# Patient Record
Sex: FEMALE | Race: WHITE | Employment: FULL TIME | URBAN - METROPOLITAN AREA
[De-identification: names, ages, dates, MRNs, and addresses within clinical notes are randomized per-mention and may not be internally consistent; named-entity substitution may affect disease eponyms.]

---

## 2020-11-05 PROBLEM — K62.3 RECTAL PROLAPSE: Status: ACTIVE | Noted: 2020-11-05

## 2021-11-30 PROBLEM — K62.3 PARTIAL RECTAL PROLAPSE: Status: ACTIVE | Noted: 2021-11-30

## 2022-04-14 ENCOUNTER — HOSPITAL ENCOUNTER (OUTPATIENT)
Dept: PHYSICAL THERAPY | Age: 42
Discharge: HOME | End: 2022-04-14
Payer: COMMERCIAL

## 2022-04-14 PROCEDURE — 97162 PT EVAL MOD COMPLEX 30 MIN: CPT

## 2022-04-14 NOTE — PROGRESS NOTES
PT DAILY TREATMENT NOTE     Patient Name: Dale Saenz  Date:2022  : 1980  [x]  Patient  Verified  Payor: Misha Monroe / Plan: [place] [employer] / Product Type: HMO /    In time:10:43  Out time:11:33  Total Treatment Time (min): 50  Visit #: 1 of 8    Medicare/BCBS Only   Total Timed Codes (min):  15 1:1 Treatment Time:  na       Treatment Area: Pelvic floor dysfunction [M62.89]    SUBJECTIVE  Pain Level (0-10 scale): 0/10  Any medication changes, allergies to medications, adverse drug reactions, diagnosis change, or new procedure performed?: [x] No    [] Yes (see summary sheet for update)  Subjective functional status/changes:   [] No changes reported  Patient initial eval today see POC for details    OBJECTIVE    35 min [x]Eval                  []Re-Eval       7 nc min Therapeutic Exercise:  [x] See flow sheet :   Rationale: Increase pelvic floor muscle strength in order to Increase fecal continence. 8 min Self Care: Thorough review and handout provided on the following: bladder fitness   Rationale:  Increase awareness and understanding of current condition to improve patients ability to independently and effectively attain goals and progress towards long term management of current condition. With   [] TE   [] TA   [] neuro   [] other: Patient Education: [x] Review HEP    [] Progressed/Changed HEP based on:   [] positioning   [] body mechanics   [] transfers   [] heat/ice application    [] other:      Other Objective/Functional Measures:    Justification for Eval Code Complexity:  Patient History : see POC   Examination see exam   Clinical Presentation: evolving  Clinical Decision Making : FOTO : 55/100    Pain Level (0-10 scale) post treatment: 0/10    ASSESSMENT/Changes in Function:   Pt was instructed in initial HEP required verbal instruction for all exercises to perform correctly.  Pt was given hand out detailing exercises and instructed in modification of exercises to tolerance, and in performing exercises safely. Pt verbalized understanding. Patient will continue to benefit from skilled PT services to modify and progress therapeutic interventions, address functional mobility deficits, address ROM deficits, address strength deficits, analyze and address soft tissue restrictions, analyze and cue movement patterns, analyze and modify body mechanics/ergonomics, assess and modify postural abnormalities, address imbalance/dizziness and instruct in home and community integration to attain remaining goals.      [x]  See Plan of Care  []  See progress note/recertification  []  See Discharge Summary         Progress towards goals / Updated goals:  No progress as goals were set today    PLAN  []  Upgrade activities as tolerated     [x]  Continue plan of care  [x]  Update interventions per flow sheet       []  Discharge due to:_  []  Other:_      Radha Trejo 4/14/2022  10:27 AM    Future Appointments   Date Time Provider Nicolette Mercedes   4/14/2022 10:30 AM Breana Hernandez [facility]

## 2022-04-14 NOTE — PROGRESS NOTES
[facility] AT [address], [address] [place] [address] Road  Phone: [phone]   [phone]  PLAN OF CARE / [address] [place] [address] PHYSICAL THERAPY SERVICES  Patient Name: Sarah Briggs : 1980   Medical   Diagnosis: Pelvic floor dysfunction [M62.89] Treatment Diagnosis: Pelvic floor dysfunction [M62.89]   Other low back pain []   Onset Date: ~     Referral Source: Darrell Minor MD [facility]): 2022   Prior Hospitalization: See medical history Provider #: [id number]   Prior Level of Function: I in all ADLs no incontinence prior to prolapse surgery, active with weightlifting    Comorbidities: , 2x c section, 2x rectal prolapse repair, depression, arthritis, tobacco use   Medications: Verified on Patient Summary List   The Plan of Care and following information is based on the information from the initial evaluation.   ===========================================================================================  Assessment / key information:  Pt is a 35 y.o.  F referred for tx of pelvic floor dysfunction s/p childbirth ( 1x vaginal, 2x c section) and rectal prolapse repair surgery (20, and 21). Pt presents primarily with dec pelvic floor and core motor control and strength which is likely resulting in pt impaired continence (fecal and urinary), pelvic pressure/prolapse, as well as low back pain. Pt dec pelvic floor and core strength is also likely impairing her ability to functionally lift, jump, and participate in her typical exercise routine. Pt scored 3/5/4/10 on PERF indicating dec pelvic floor mm strength and endurance, and also demonstrated dec global core and hip strength as indicated in objective measures which is also likely contributing to pelvic floor dysfunction. Patient scored 54 on FOTO for bowel leakage indicating 45% functional impairment.   Patient can benefit from PT to increase pelvic floor muscle strength, decrease urinary urgency and incontinence, as well as improve hip/core strength, ROM, and dynamic stability for improved functional mobility and QOL. Treatment may include, but is not limited to, therapeutic exercise, therapeutic activities, neuromuscular re-education, manual therapy, and modalities as indicated including biofeedback at therapist discretion. Subjective: Pt reports that her sx started after her second pregnancy in 2020. She had some complications with all of her pregnancies with the placement of her children in the uterus. Her first baby was born vaginally, and she had to have a D&C afterward to remove the placenta. The next two pregnancies resulted in c sections. Her second c section took longer to heal. She feels the rectal prolapse started during her second pregnancy, and worsened after. She had rectal prolapse surgery on 7/7/2020, which resulted in a second surgery on 11/30/21 as the first surgery did not remove enough tissue. The second surgery resulted in prolonged healing, however she has no pain, but if she has loose stools at all she will lose bowel control. She also has mild urinary incontinence mostly with jumping or straining. Her bladder also feels like it drops and she gets pressure in her pelvis with any lifting and straining. She can feel the pressure if she has to strain at all when she poops, and she has not been able to lift weights due to the sx. Prior to her prolapse surgeries she had no issues with fecal incontinence or pelvic pressure and was able to lift . She does report moderate low back pain she feels is due to having abdominal separation during pregnancy. It is worse with lifting ( her 3year old son), increased activity, it is intermittent and she takes motrin daily to dec the pain. Ice  And rest also helps.      Objective:    Current complaints  stress incontinence, fecal incontinence    Bladder/Bowel complaint longevity:  3 - 5 years bladder  ~5 months bowel    Symptom progression:  worsened    Frequency of UI/FI: less than 1xper month UI, FI is dependent on diet    Pad use:  none    Pad wetness when changed:  drop or two    Daytime urinary/bowel frequency:  5-8x per day/1x per day    Nocturia:  1x/ night    Patient has failed previous pelvic floor muscle training? [] Yes    [x] No    Pelvic floor MMT  3 - good contraction, 3-5 seconds  PERF (Performance/Endurance/Repetitions//Flicks): 2/1/5/72     Pain complaint:  Location:low back    Pain scale:  Verbal Analog scale: average daily pain 3, best day 1, worst pain 6    Pain description:  daily: intermittent  worsens with: increased activity/movement and lifting  improves with: rest    Pelvic floor manual exam: Performed via internal vaginal assessment  female-upon vaginal palpation of the pelvic floor, the following was noted: good pelvic floor tone and tension with no pain upon palpation    FOTO= 55 bowel leakage     Global Core Strength/stability  Hip ROM: ext L=15 deg, R=20 deg,   Hip strength: flexion L=3+/5, R=4/5, ext L= 3+/5 R=4/5, abd  L=3+/5, R=4/5  Transverse abdominus activation: fair with dec endurance. Abdominal bulging noted with head lift, noted diastasis recti (3 cm at umbilicus extending 3cm above and below navel)  Functional squat:mild R lateral shift at depth, genu valgus noted JILLIAN L>R with SLS squat  Special test: positive for L ant/Rpost pelvic rotation,     ==========================================================================================================================  Evaluation Complexity History HIGH Complexity :3+ comorbidities / personal factors will impact the outcome/ POC ; Examination MEDIUM Complexity : 3 Standardized tests and measures addressing body structure, function, activity limitation and / or participation in recreation  ;Presentation MEDIUM Complexity : Evolving with changing characteristics  ; Clinical Decision Making MEDIUM Complexity : FOTO score of 26-74  Overall Complexity Rating: MEDIUM    Problem List: Pelvic pain/dysfunction, Decreased pelvic floor mm awareness, Decreased pelvic floor mm strength, Use of accessory muscles and Improper voiding habitspain affecting function, decrease ROM, decrease strength, edema affecting function, impaired gait/ balance, decrease ADL/ functional abilitiies, decrease activity tolerance, decrease flexibility/ joint mobility and decrease transfer abilities     Treatment Plan may include any combination of the following:   Therapeutic exercise, Urge suppression techniques, Neuromuscular re-education, Manual therapy, Physical agent/modality and Patient educationTherapeutic exercise, Therapeutic activities, Neuromuscular re-education, Physical agent/modality, Gait/balance training, Manual therapy, Patient education, Self Care training, Functional mobility training, Home safety training and Stair training    Patient / Family readiness to learn indicated by: asking questions  Persons(s) to be included in education: patient (P)  Barriers to Learning/Limitations: None  Measures taken, if barriers to learning:    Patient Goal (s): \"to exercise again & overall strengthening of pelvic floor to hold my parts in!\"   Patient self reported health status: good  Rehabilitation Potential: good    Short Term Goals: To be accomplished in 1 weeks:  1) Goal:  Patient performing pelvic floor exercises 3x day. Status at last note/certification: issued and reviewed  Long Term Goals:  To be accomplished in 8-12 treatments:  1) Goal: Patient will demonstrate 4/10/10/10 on PERF for improved pelvic floor strength  Status at last note/certification: 1/7/5/26   2) Goal: Patient independent in HEP  Status at last note/certification:issued and reviewed  3) Goal: Patient will report 50% improvement in pelvic pressure/prolapse sx for improved QOL  Status at last note/certification: na  4) Goal: Patient will report 50% improvement in bowel sx for improved QOL  Status at last note/certification: na  5) Goal: Patient will report ability to participate in typical exercise routine for improved general health and wellbeing. Status at last note/certification: unable      Frequency / Duration:   Patient to be seen  1  times per week for 8  weeks:  Patient / Caregiver education and instruction: self care and exercises  Therapist Signature: Michelle Dixon Date: 9/03/0889   Certification Period: na Time: 10:33 AM     ===========================================================================================  I certify that the above Physical Therapy Services are being furnished while the patient is under my care. I agree with the treatment plan and certify that this therapy is necessary. Physician Signature:        Date:       Time:        Manuel Partida MD  Please sign and return to [facility] AT [facility] at [facility] or you may fax the signed copy to [phone]. Thank you.

## 2022-04-18 ENCOUNTER — HOSPITAL ENCOUNTER (OUTPATIENT)
Dept: PHYSICAL THERAPY | Age: 42
Discharge: HOME | End: 2022-04-18
Payer: COMMERCIAL

## 2022-04-18 PROCEDURE — 97112 NEUROMUSCULAR REEDUCATION: CPT

## 2022-04-18 PROCEDURE — 97110 THERAPEUTIC EXERCISES: CPT

## 2022-04-18 NOTE — PROGRESS NOTES
PT DAILY TREATMENT NOTE    Patient Name: Ruben Chavis  Date:2022  : 1980  [x]  Patient  Verified  Payor: Tracy London / Plan: [employer] / Product Type: HMO /    In time:10:16  Out time:11:03  Total Treatment Time (min): 47  Total Timed Codes (min): 47  1:1 Treatment Time ( only): 52   Visit #: 2 of 8    Treatment Area: Pelvic floor dysfunction [M62.89]  Other low back pain [M54.59]    SUBJECTIVE  Pain Level (0-10 scale): 0/10  Any medication changes, allergies to medications, adverse drug reactions, diagnosis change, or new procedure performed?: [x] No    [] Yes (see summary sheet for update)  Subjective functional status/changes:   [] No changes reported  Patient reports she was extremely busy over the weekend due to having her daughters birthday party and easter, she has not been able to do her program yet. OBJECTIVE    26 min Therapeutic Exercise:  [x] See flow sheet :   Rationale: Increase pelvic floor muscle strength in order to Increase urinary continence and Increase fecal continence. 21 min Neuromuscular Re-education:  [x]  See flow sheet :   Rationale: Improve quality of pelvic floor contractions and Increase core strength in order to Increase urinary continence and Increase fecal continence. With   [] TE   [] TA   [] neuro   [] other: Patient Education: [x] Review HEP    [] Progressed/Changed HEP based on:   [] positioning   [] body mechanics   [] transfers   [] heat/ice application    [] other:      Other Objective/Functional Measures: program initiated today-1st follow up     Pain Level (0-10 scale) post treatment: 0/10    ASSESSMENT/Changes in Function: Pt was instructed in core and pelvic floor training today to aid in dynamic support of pelvis and pelvic organs. Pt required vc and demo with all exercises to perform correctly. Pt required vc and tc to align breathing with pelvic floor and core activation. Patient tolerated today's session well with no c/o pain. Patient demonstrated understanding of today's instruction, education, and recommendations. Patient is progressing appropriately towards goals. Patient will continue to benefit from skilled PT services to modify and progress therapeutic interventions, address functional mobility deficits, address ROM deficits, address strength deficits, analyze and address soft tissue restrictions, analyze and cue movement patterns, analyze and modify body mechanics/ergonomics, assess and modify postural abnormalities, address imbalance/dizziness and instruct in home and community integration to attain remaining goals. [x]  See Plan of Care  []  See progress note/recertification  []  See Discharge Summary         Progress towards goals / Updated goals:  Short Term Goals: To be accomplished in 1 weeks:  1) Goal:  Patient performing pelvic floor exercises 3x day. Status at last note/certification: issued and reviewed, noncompliant 4/18/22  Long Term Goals: To be accomplished in 8-12 treatments:  1) Goal: Patient will demonstrate 4/10/10/10 on PERF for improved pelvic floor strength  Status at last note/certification: 4/8/8/00   2) Goal: Patient independent in HEP  Status at last note/certification:issued and reviewed  3) Goal: Patient will report 50% improvement in pelvic pressure/prolapse sx for improved QOL  Status at last note/certification: na  4) Goal: Patient will report 50% improvement in bowel sx for improved QOL  Status at last note/certification: na  5) Goal: Patient will report ability to participate in typical exercise routine for improved general health and wellbeing.    Status at last note/certification: unable    PLAN  []  Upgrade activities as tolerated     [x]  Continue plan of care  [x]  Update interventions per flow sheet       []  Discharge due to:_  []  Other:_      Johnie Kim 4/18/2022  7:18 AM    Future Appointments   Date Time Provider Nicolette Mercedes   4/18/2022 10:15 AM Shruti Ybarra [facility] 4/27/2022  9:30 AM Taran Europe [facility]   5/4/2022  9:30 AM Blaine Europe [facility]   5/11/2022  [facility]   5/18/2022  9:30 AM Taran Chamberlain [facility]   5/25/2022  [facility]

## 2022-04-27 ENCOUNTER — APPOINTMENT (OUTPATIENT)
Dept: PHYSICAL THERAPY | Age: 42
End: 2022-04-27
Payer: COMMERCIAL

## 2022-05-11 ENCOUNTER — HOSPITAL ENCOUNTER (OUTPATIENT)
Dept: PHYSICAL THERAPY | Age: 42
End: 2022-05-11
Payer: COMMERCIAL

## 2022-05-18 ENCOUNTER — HOSPITAL ENCOUNTER (OUTPATIENT)
Dept: PHYSICAL THERAPY | Age: 42
Discharge: HOME | End: 2022-05-18
Payer: COMMERCIAL

## 2022-05-18 PROCEDURE — 97110 THERAPEUTIC EXERCISES: CPT

## 2022-05-18 PROCEDURE — 97112 NEUROMUSCULAR REEDUCATION: CPT

## 2022-05-18 NOTE — PROGRESS NOTES
[facility]YSICAL [facility]ERAPY  [address], [place], [address] - Phone: [phone]  Fax: [phone]  PROGRESS NOTE  Patient Name: Mikel Harris : 1980   Treatment/Medical Diagnosis: Pelvic floor dysfunction [M62.89]  Other low back pain [M54.59]   Referral Source: Olman Kumar MD     Date of Initial Visit: 22 Attended Visits: 3 Missed Visits: 3     SUMMARY OF TREATMENT  Pt is a 35 y.o.  F referred for tx of pelvic floor dysfunction. Skilled care has included therapeutic exercise, therapeutic activities, neuromuscular re-education, manual therapy, and modalities as indicated including bio feed back    CURRENT STATUS  Pt reports initially 50% improvement in sx when she was consistent with her program, she has unfortunately had a regression and now reports  0% overall improvement in sx. 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 Although progress at this point has been minimal, with improved attendance and consistency with HEP pt will likely improve with skilled care. Skilled care is warranted to continue in order to progress pt to PLOF. Pt and PT also discussed fecal incontinence issue with pt reporting ongoing intermittent loose stool with bloating and abdominal crapming with unknown cause.  Pt is considering seeking MD opinion on potential food intolerance and/or bacterial imbalance*    Functional Gains: improvements in pressure feeling, improved body awareness  Functional Deficits: continued bowel/bladder leakage, continued pressure feeling in pelvis  % improvement: 50% while doing program, currently 0% due to regression  Patient Goal: \"to continue to strengthen the muscles\"   Objective:     Current complaints  stress incontinence, fecal incontinence     Bladder/Bowel complaint longevity:  3 - 5 years bladder  ~5 months bowel     Symptom progression:  worsened     Frequency of UI/FI: less than 1xper month UI, FI is dependent on diet     Pad use:  none     Pad wetness when changed:  drop or two     Daytime urinary/bowel frequency:  5-8x per day/1x per day     Nocturia:  1x/ night     Pelvic floor MMT  3 - good contraction, 3-5 seconds  PERF (Performance/Endurance/Repetitions//Flicks): 8/1/6/64      Pain complaint:  Location:low back     Pain scale:  Verbal Analog scale: average daily pain 3, best day 1, worst pain 6     Pain description:  daily: intermittent  worsens with: increased activity/movement and lifting  improves with: rest     Pelvic floor manual exam: Performed via internal vaginal assessment  female-upon vaginal palpation of the pelvic floor, the following was noted: good pelvic floor tone and tension with no pain upon palpation     FOTO= will take at DC     Global Core Strength/stability  Hip ROM: ext L=15 deg, R=20 deg,   Hip strength: flexion L=3+/5, R=4/5, ext L= 3+/5 R=4/5, abd  L=3+/5, R=4/5  Transverse abdominus activation: fair with dec endurance. Abdominal bulging noted with head lift, noted diastasis recti (3 cm at umbilicus extending 3cm above and below navel)  Functional squat:mild R lateral shift at depth, genu valgus noted JILLIAN L>R with SLS squat  Special test: positive for L ant/Rpost pelvic rotation,     Goal/Measure of Progress Goal Met? 1. Patient performing pelvic floor exercises 3x day. Status at last Eval: issued and reviewed, Current Status: non compliant no   2. Patient will demonstrate 4/10/10/10 on PERF for improved pelvic floor strength   Status at last Eval: 3/5/4/10  Current Status: 3/5/4/10  no   3. Patient independent in HEP   Status at last Eval: issued and reviewed Current Status: reviewed, updated, not independent no, improving     Goal/Measure of Progress Goal Met? 1.   Patient will report 50% improvement in pelvic pressure/prolapse sx for improved QOL   Status at last Eval: na Current Status: 0% no   2. Patient will report 50% improvement in bowel sx for improved QOL   Status at last Eval: na Current Status: 0% no   3. Patient will report ability to participate in typical exercise routine for improved general health and wellbeing.    Status at last Eval: unable Current Status: unable no     New Goals to be achieved in __8__  treatments: continue toward goals as established at IE  Short Term Goals: To be accomplished in 1 weeks:  1) Goal:  Patient performing pelvic floor exercises 3x day. Status at last note/certification: issued and reviewed, non compliant  Long Term Goals: To be accomplished in 8-12 treatments:  1) Goal: Patient will demonstrate 4/10/10/10 on PERF for improved pelvic floor strength  Status at last note/certification: 3/5/4/10   2) Goal: Patient independent in HEP  Status at last note/certification:reviewed, updated, not independent  3) Goal: Patient will report 50% improvement in pelvic pressure/prolapse sx for improved QOL  Status at last note/certification: 0%  4) Goal: Patient will report 50% improvement in bowel sx for improved QOL  Status at last note/certification: 0%  5) Goal: Patient will report ability to participate in typical exercise routine for improved general health and wellbeing.   Status at last note/certification: unable    RECOMMENDATIONS  Continue with skilled care 1x per week for 8 weeks to progress pelvic floor strength, core stability, and motor control for improved ability to dynamically support the pelvic organs and return to PLOF  If you have any questions/comments please contact us directly at [phone]. Thank you for allowing us to assist in the care of your patient.     Therapist Signature: Mortimer Rusk DPOSMAR Date: 5/18/2022     Time: 7:50 AM   NOTE TO PHYSICIAN:  PLEASE COMPLETE THE ORDERS BELOW AND FAX TO   [facility]: [phone]  If you are unable to process this request in 24 hours please contact our office: [phone]    ___ I have read the above report and request that my patient continue as recommended.   ___ I have read the above report and request that my patient continue therapy with the following changes/special instructions:_________________________________________________________   ___ I have read the above report and request that my patient be discharged from therapy.      Physician Signature:        Date:       Time:       Jarvis Mejía MD

## 2022-05-18 NOTE — PROGRESS NOTES
PT DAILY TREATMENT NOTE    Patient Name: Alexey Cassette  Date:2022  : 1980  [x]  Patient  Verified  Payor: Jose Johnson / Plan: [employer] / Product Type: HMO /    In time:9:31  Out time:10:20  Total Treatment Time (min): 49  Total Timed Codes (min): 49  1:1 Treatment Time ( only): 52   Visit #: 3 of 8    Treatment Area: Pelvic floor dysfunction [M62.89]  Other low back pain [M54.59]    SUBJECTIVE  Pain Level (0-10 scale): 0/10  Any medication changes, allergies to medications, adverse drug reactions, diagnosis change, or new procedure performed?: [x] No    [] Yes (see summary sheet for update)  Subjective functional status/changes:   [] No changes reported  Patient reports she is having to start over, she was doing her HEP, and was noticing improvements in the pressure feeling, however, fell off her program due to getting busy with work and her nose being broken. She is currently on antibiotics due to a root canal and UTI. OBJECTIVE    28 min Therapeutic Exercise:  [x] See flow sheet :   Rationale: Increase pelvic floor muscle strength in order to Increase urinary continence and Increase fecal continence. 21 min Neuromuscular Re-education:  [x]  See flow sheet :   Rationale: Improve quality of pelvic floor contractions and Increase core strength in order to Increase urinary continence and Increase fecal continence. With   [] TE   [] TA   [] neuro   [] other: Patient Education: [x] Review HEP    [] Progressed/Changed HEP based on:   [] positioning   [] body mechanics   [] transfers   [] heat/ice application    [] other:      Other Objective/Functional Measures:    See PN  Pain Level (0-10 scale) post treatment: 0/10    ASSESSMENT/Changes in Function: Pt and PT reviewed last program update, and progressed program with addition of seated and standing exercises with intention of improving pelvic organ dynamic support with functional lifting.  PT plans to assess diaphragm and trunk fascial mobility NV for adhesions and tissue restriction that may be impairing mobility. PT also plans to add functional core strengthening as tolerated    Patient will continue to benefit from skilled PT services to modify and progress therapeutic interventions, address functional mobility deficits, address ROM deficits, address strength deficits, analyze and address soft tissue restrictions, analyze and cue movement patterns, analyze and modify body mechanics/ergonomics, assess and modify postural abnormalities, address imbalance/dizziness and instruct in home and community integration to attain remaining goals.      [x]  See Plan of Care  []  See progress note/recertification  []  See Discharge Summary         Progress towards goals / Updated goals:See PN    PLAN  []  Upgrade activities as tolerated     [x]  Continue plan of care  [x]  Update interventions per flow sheet       []  Discharge due to:_  []  Other:_      Tomás Noriega 5/18/2022  7:18 AM    Future Appointments   Date Time Provider Nicolette Mercedes   5/18/2022  9:30 AM Violette Rodríguez [facility]   5/25/2022  9:30 AM Violette Rodríguez [facility]

## 2022-05-25 ENCOUNTER — HOSPITAL ENCOUNTER (OUTPATIENT)
Dept: PHYSICAL THERAPY | Age: 42
Discharge: HOME | End: 2022-05-25
Payer: COMMERCIAL

## 2022-05-25 PROCEDURE — 97110 THERAPEUTIC EXERCISES: CPT

## 2022-05-25 PROCEDURE — 97140 MANUAL THERAPY 1/> REGIONS: CPT

## 2022-05-25 NOTE — PROGRESS NOTES
PT DAILY TREATMENT NOTE    Patient Name: Antonio Purvis  Date:2022  : 1980  [x]  Patient  Verified  Payor: Darrell Dragan / Plan: [employer] / Product Type: HMO /    In time:9:32  Out time:10:20  Total Treatment Time (min): 48  Total Timed Codes (min): 48  1:1 Treatment Time ([facility] only): 50  Visit #: 4 of 11    Treatment Area: Pelvic floor dysfunction [M62.89]  Other low back pain [M54.59]    SUBJECTIVE  Pain Level (0-10 scale): 0/10  Any medication changes, allergies to medications, adverse drug reactions, diagnosis change, or new procedure performed?: [x] No    [] Yes (see summary sheet for update)  Subjective functional status/changes:   [] No changes reported  Patient reports she has been doing her kegels on and off throughout the day when she remembers. OBJECTIVE    24 min Therapeutic Exercise:  [x] See flow sheet :   Rationale: Increase pelvic floor muscle strength in order to Increase urinary continence and Increase fecal continence. 24 min Manual Therapy:  Gr III t spine P/A glides, inferior glides to upper ribs, skin rolling to lumbar and t spine, diphragm release JILLIAN L>R, upper abdominal skin rolling   Rationale: increase tissue extensibility in order to Improve ability to perform ADLs and fully extend pelvic floor and diaphragm. With   [] TE   [] TA   [] neuro   [] other: Patient Education: [x] Review HEP    [] Progressed/Changed HEP based on:   [] positioning   [] body mechanics   [] transfers   [] heat/ice application    [] other:      Other Objective/Functional Measures: Added core strengthening  Pain Level (0-10 scale) post treatment: 0/10    ASSESSMENT/Changes in Function: Pt was instructed in updated core exercises with increasing core strength for improved ability to perform ADLs. Pt was educated in proper breathing technique as well as relationship between pelvic floor and diaphragm.  Manual therapy was incorporated with intention of improving pt ability to activate diaphragm to aid in fully relaxing pelvic floor and regulating intraabdominal pressure. Patient will continue to benefit from skilled PT services to modify and progress therapeutic interventions, address functional mobility deficits, address ROM deficits, address strength deficits, analyze and address soft tissue restrictions, analyze and cue movement patterns, analyze and modify body mechanics/ergonomics, assess and modify postural abnormalities, address imbalance/dizziness and instruct in home and community integration to attain remaining goals. [x]  See Plan of Care  []  See progress note/recertification  []  See Discharge Summary         Progress towards goals / Updated goals:  Short Term Goals: To be accomplished in 1 weeks:  1) Goal:  Patient performing pelvic floor exercises 3x day. Status at last note/certification: issued and reviewed, non compliant,  Partial compliance 5/25/22  Long Term Goals:  To be accomplished in 8-12 treatments:  1) Goal: Patient will demonstrate 4/10/10/10 on PERF for improved pelvic floor strength  Status at last note/certification: 3/5/4/10   2) Goal: Patient independent in HEP  Status at last note/certification:reviewed, updated, not independent  3) Goal: Patient will report 50% improvement in pelvic pressure/prolapse sx for improved QOL  Status at last note/certification: 0%  4) Goal: Patient will report 50% improvement in bowel sx for improved QOL  Status at last note/certification: 0%  5) Goal: Patient will report ability to participate in typical exercise routine for improved general health and wellbeing.   Status at last note/certification: unable  PLAN  []  Upgrade activities as tolerated     [x]  Continue plan of care  [x]  Update interventions per flow sheet       []  Discharge due to:_  []  Other:_      Franko Montgomery 5/25/2022  7:18 AM    Future Appointments   Date Time Provider Nicolette Mercedes   5/25/2022  9:30 AM Mckinley Brooke [facility]

## 2022-06-08 ENCOUNTER — HOSPITAL ENCOUNTER (OUTPATIENT)
Dept: PHYSICAL THERAPY | Age: 42
Discharge: HOME | End: 2022-06-08
Payer: COMMERCIAL

## 2022-06-08 PROCEDURE — 97110 THERAPEUTIC EXERCISES: CPT

## 2022-06-08 PROCEDURE — 97140 MANUAL THERAPY 1/> REGIONS: CPT

## 2022-06-08 NOTE — PROGRESS NOTES
PT DAILY TREATMENT NOTE    Patient Name: Dede Yanes  Date:2022  : 1980  [x]  Patient  Verified  Payor: Mariely Lozoya / Plan: [address] [place] [address] [employer] / Product Type: HMO /    In time:3:55  Out time:4:55  Total Treatment Time (min): 60  Total Timed Codes (min): 60  1:1 Treatment Time ( [address] only): 60  Visit #: 5 of 11    Treatment Area: Pelvic floor dysfunction [M62.89]  Other low back pain [M54.59]    SUBJECTIVE  Pain Level (0-10 scale): 0/10  Any medication changes, allergies to medications, adverse drug reactions, diagnosis change, or new procedure performed?: [x] No    [] Yes (see summary sheet for update)  Subjective functional status/changes:   [] No changes reported  Patient reports she has been working on her kegels as often as she remembers. OBJECTIVE    40 min Therapeutic Exercise:  [x] See flow sheet :   Rationale: Increase pelvic floor muscle strength in order to Increase urinary continence and Increase fecal continence. 20 min Manual Therapy:  skin rolling to lumbar and t spine, diphragm release JILLIAN L>R,  scar mobilization   Rationale: increase tissue extensibility in order to Improve ability to perform ADLs and fully extend pelvic floor and diaphragm. With   [] TE   [] TA   [] neuro   [] other: Patient Education: [x] Review HEP    [] Progressed/Changed HEP based on:   [] positioning   [] body mechanics   [] transfers   [] heat/ice application    [] other:      Other Objective/Functional Measures:    Progressed core strengthening  Pain Level (0-10 scale) post treatment: 0/10    ASSESSMENT/Changes in Function: Pt was again instructed in updated core program with emphasis on improving transverse abdominis strength while incorporating pelvic floor with intention of continuing to address pelvic pressure sx and incontinence. Pt reported no pain with any exercise, she did require vc and demo with all new exercises to perform correctly.  PT plans to add in thoracic mobilization NV for continued progression with pt ability to breathe/extend diaphragm. Patient will continue to benefit from skilled PT services to modify and progress therapeutic interventions, address functional mobility deficits, address ROM deficits, address strength deficits, analyze and address soft tissue restrictions, analyze and cue movement patterns, analyze and modify body mechanics/ergonomics, assess and modify postural abnormalities, address imbalance/dizziness and instruct in home and community integration to attain remaining goals. [x]  See Plan of Care  []  See progress note/recertification  []  See Discharge Summary         Progress towards goals / Updated goals:  Short Term Goals: To be accomplished in 1 weeks:  1) Goal:  Patient performing pelvic floor exercises 3x day. Status at last note/certification: issued and reviewed, non compliant,  Partial compliance 5/25/22  Long Term Goals:  To be accomplished in 8-12 treatments:  1) Goal: Patient will demonstrate 4/10/10/10 on PERF for improved pelvic floor strength  Status at last note/certification: 3/5/4/10 addressing with kegels 6/8/22   2) Goal: Patient independent in HEP  Status at last note/certification:reviewed, updated, not independent  3) Goal: Patient will report 50% improvement in pelvic pressure/prolapse sx for improved QOL  Status at last note/certification: 0%  4) Goal: Patient will report 50% improvement in bowel sx for improved QOL  Status at last note/certification: 0%  5) Goal: Patient will report ability to participate in typical exercise routine for improved general health and wellbeing.   Status at last note/certification: unable  PLAN  []  Upgrade activities as tolerated     [x]  Continue plan of care  [x]  Update interventions per flow sheet       []  Discharge due to:_  []  Other:_      Carman Mcardle 6/8/2022  7:18 AM    Future Appointments   Date Time Provider Nicolette Mercedes   6/8/2022  3:45 PM Bethena Emperor [facility]   6/15/2022 10:15 AM Nati Verde [facility]   6/22/2022 10:15 AM Nati CORCORAN [facility]   6/29/2022  9:30 AM Nati Verde [facility]

## 2022-06-15 ENCOUNTER — APPOINTMENT (OUTPATIENT)
Dept: PHYSICAL THERAPY | Age: 42
End: 2022-06-15
Payer: COMMERCIAL

## 2022-06-29 ENCOUNTER — APPOINTMENT (OUTPATIENT)
Dept: PHYSICAL THERAPY | Age: 42
End: 2022-06-29
Payer: COMMERCIAL

## 2022-07-18 NOTE — PROGRESS NOTES
[facility]  [facility], [place], [address] [place] [address] Road - Phone: [phone]  Fax: [phone]  DISCHARGE SUMMARY  Patient Name: Anais Ellington : 1980   Treatment/Medical Diagnosis: Pelvic floor dysfunction [M62.89]  Other low back pain [M54.59]   Referral Source: Jairo Vanessa MD     Date of Initial Visit: 22 Attended Visits: 5 Missed Visits: 4     SUMMARY OF TREATMENT  Pt was seen for 5 visits to skilled care with most recent visit dated 22. Pt unfortunately no showed, or cancelled more than 3 visits, and will be DC per clinic attendance/compliance policy. As DC was unplanned a formal reassessment could not be conducted. See progress note dated 22 for most recent progress and objective measure. Pt will DC today per clinic attendance policy and 30 day policy. RECOMMENDATIONS  Discontinue therapy due to lack of attendance or compliance. If you have any questions/comments please contact us directly at [phone]. Thank you for allowing us to assist in the care of your patient.     Therapist Signature: Carson Escobar DPT Date: 22     Time: 1:12 PM